# Patient Record
Sex: MALE | Race: WHITE | NOT HISPANIC OR LATINO | URBAN - METROPOLITAN AREA
[De-identification: names, ages, dates, MRNs, and addresses within clinical notes are randomized per-mention and may not be internally consistent; named-entity substitution may affect disease eponyms.]

---

## 2019-10-09 ENCOUNTER — APPOINTMENT (RX ONLY)
Dept: URBAN - METROPOLITAN AREA CLINIC 33 | Facility: CLINIC | Age: 59
Setting detail: DERMATOLOGY
End: 2019-10-09

## 2019-10-09 DIAGNOSIS — Z48.02 ENCOUNTER FOR REMOVAL OF SUTURES: ICD-10-CM

## 2019-10-09 DIAGNOSIS — D18.0 HEMANGIOMA: ICD-10-CM

## 2019-10-09 DIAGNOSIS — L57.0 ACTINIC KERATOSIS: ICD-10-CM

## 2019-10-09 DIAGNOSIS — L21.8 OTHER SEBORRHEIC DERMATITIS: ICD-10-CM

## 2019-10-09 PROBLEM — D18.01 HEMANGIOMA OF SKIN AND SUBCUTANEOUS TISSUE: Status: ACTIVE | Noted: 2019-10-09

## 2019-10-09 PROBLEM — M12.9 ARTHROPATHY, UNSPECIFIED: Status: ACTIVE | Noted: 2019-10-09

## 2019-10-09 PROCEDURE — 17000 DESTRUCT PREMALG LESION: CPT

## 2019-10-09 PROCEDURE — 99024 POSTOP FOLLOW-UP VISIT: CPT

## 2019-10-09 PROCEDURE — ? COUNSELING

## 2019-10-09 PROCEDURE — 17003 DESTRUCT PREMALG LES 2-14: CPT

## 2019-10-09 PROCEDURE — 99213 OFFICE O/P EST LOW 20 MIN: CPT | Mod: 25

## 2019-10-09 PROCEDURE — ? PRESCRIPTION

## 2019-10-09 PROCEDURE — ? LIQUID NITROGEN

## 2019-10-09 PROCEDURE — ? SUTURE REMOVAL (GLOBAL PERIOD)

## 2019-10-09 PROCEDURE — ? OBSERVATION

## 2019-10-09 RX ORDER — HYDROCORTISONE 25 MG/G
CREAM TOPICAL
Qty: 1 | Refills: 2 | Status: ERX | COMMUNITY
Start: 2019-10-09

## 2019-10-09 RX ADMIN — HYDROCORTISONE: 25 CREAM TOPICAL at 00:00

## 2019-10-09 ASSESSMENT — LOCATION DETAILED DESCRIPTION DERM
LOCATION DETAILED: RIGHT LATERAL UPPER BACK
LOCATION DETAILED: LEFT SUPERIOR UPPER BACK
LOCATION DETAILED: LEFT ULNAR DORSAL HAND
LOCATION DETAILED: RIGHT VENTRAL PROXIMAL FOREARM
LOCATION DETAILED: RIGHT LATERAL SUPERIOR CHEST
LOCATION DETAILED: LEFT POSTERIOR SHOULDER
LOCATION DETAILED: RIGHT MEDIAL SUPERIOR CHEST
LOCATION DETAILED: LEFT LATERAL SUPERIOR CHEST
LOCATION DETAILED: LEFT RADIAL DORSAL HAND
LOCATION DETAILED: RIGHT ANTERIOR SHOULDER
LOCATION DETAILED: LEFT MEDIAL SUPERIOR CHEST
LOCATION DETAILED: LEFT MEDIAL INFERIOR CHEST

## 2019-10-09 ASSESSMENT — LOCATION ZONE DERM
LOCATION ZONE: ARM
LOCATION ZONE: HAND
LOCATION ZONE: TRUNK

## 2019-10-09 ASSESSMENT — LOCATION SIMPLE DESCRIPTION DERM
LOCATION SIMPLE: CHEST
LOCATION SIMPLE: LEFT UPPER BACK
LOCATION SIMPLE: RIGHT SHOULDER
LOCATION SIMPLE: RIGHT FOREARM
LOCATION SIMPLE: LEFT HAND
LOCATION SIMPLE: RIGHT UPPER BACK
LOCATION SIMPLE: LEFT SHOULDER

## 2019-10-09 NOTE — PROCEDURE: MIPS QUALITY
Quality 226: Preventive Care And Screening: Tobacco Use: Screening And Cessation Intervention: Patient screened for tobacco use, is a smoker AND received Cessation Counseling
Detail Level: Detailed
Quality 431: Preventive Care And Screening: Unhealthy Alcohol Use - Screening: Patient screened for unhealthy alcohol use using a single question and scores 2 or greater episodes per year and brief intervention occurred

## 2019-10-09 NOTE — PROCEDURE: SUTURE REMOVAL (GLOBAL PERIOD)
Detail Level: Detailed
Add 95902 Cpt? (Important Note: In 2017 The Use Of 88780 Is Being Tracked By Cms To Determine Future Global Period Reimbursement For Global Periods): yes

## 2019-10-09 NOTE — PROCEDURE: LIQUID NITROGEN
Consent: The patient's consent was obtained including but not limited to risks of crusting, scabbing, blistering, scarring, darker or lighter pigmentary change, recurrence, incomplete removal and infection.
Detail Level: Zone
Render Note In Bullet Format When Appropriate: No
Duration Of Freeze Thaw-Cycle (Seconds): 0
Post-Care Instructions: I reviewed with the patient in detail post-care instructions. Patient is to wear sunprotection, and avoid picking at any of the treated lesions. Pt may apply Vaseline to crusted or scabbing areas.

## 2019-11-22 ENCOUNTER — APPOINTMENT (RX ONLY)
Dept: URBAN - METROPOLITAN AREA CLINIC 1 | Facility: CLINIC | Age: 59
Setting detail: DERMATOLOGY
End: 2019-11-22

## 2019-11-22 VITALS — DIASTOLIC BLOOD PRESSURE: 59 MMHG | SYSTOLIC BLOOD PRESSURE: 119 MMHG

## 2019-11-22 PROBLEM — C44.519 BASAL CELL CARCINOMA OF SKIN OF OTHER PART OF TRUNK: Status: ACTIVE | Noted: 2019-11-22

## 2019-11-22 PROBLEM — C44.619 BASAL CELL CARCINOMA OF SKIN OF LEFT UPPER LIMB, INCLUDING SHOULDER: Status: ACTIVE | Noted: 2019-11-22

## 2019-11-22 PROCEDURE — ? REPAIR NOTE

## 2019-11-22 PROCEDURE — ? PRESCRIPTION

## 2019-11-22 PROCEDURE — 13101 CMPLX RPR TRUNK 2.6-7.5 CM: CPT

## 2019-11-22 PROCEDURE — 13102 CMPLX RPR TRUNK ADDL 5CM/<: CPT

## 2019-11-22 PROCEDURE — 17313 MOHS 1 STAGE T/A/L: CPT

## 2019-11-22 PROCEDURE — ? MOHS SURGERY

## 2019-11-22 RX ORDER — CEFDINIR 300 MG/1
CAPSULE ORAL
Qty: 14 | Refills: 0 | Status: ERX | COMMUNITY
Start: 2019-11-22

## 2019-11-22 RX ADMIN — CEFDINIR: 300 CAPSULE ORAL at 17:36

## 2019-11-22 NOTE — HPI: SAME DAY REPAIR FOLLOWING MOHS SURGERY
Body Location Override (Optional): Left shoulder
Which Doctor Performed Mohs? (Optional): Iftikhar Lilly

## 2019-11-22 NOTE — HPI: MOHS SURGERY CONSULTATION
Has The Cancer Been Biopsied Before?: has been previously biopsied
Body Location Override (Optional): left shoulder
When Was Your Biopsy?: 9/25/19
Who Is Your Referring Provider?: Dr. Elliott

## 2019-12-09 ENCOUNTER — APPOINTMENT (RX ONLY)
Dept: URBAN - METROPOLITAN AREA CLINIC 33 | Facility: CLINIC | Age: 59
Setting detail: DERMATOLOGY
End: 2019-12-09

## 2019-12-09 DIAGNOSIS — L21.8 OTHER SEBORRHEIC DERMATITIS: ICD-10-CM

## 2019-12-09 PROBLEM — C44.619 BASAL CELL CARCINOMA OF SKIN OF LEFT UPPER LIMB, INCLUDING SHOULDER: Status: ACTIVE | Noted: 2019-12-09

## 2019-12-09 PROCEDURE — ? TREATMENT REGIMEN

## 2019-12-09 PROCEDURE — ? SUTURE REMOVAL (GLOBAL PERIOD)

## 2019-12-09 PROCEDURE — 99213 OFFICE O/P EST LOW 20 MIN: CPT

## 2019-12-09 PROCEDURE — ? PRESCRIPTION

## 2019-12-09 PROCEDURE — ? COUNSELING

## 2019-12-09 PROCEDURE — 99024 POSTOP FOLLOW-UP VISIT: CPT

## 2019-12-09 RX ORDER — PIMECROLIMUS 10 MG/G
CREAM TOPICAL BID
Qty: 1 | Refills: 3 | Status: ERX | COMMUNITY
Start: 2019-12-09

## 2019-12-09 RX ORDER — HYDROCORTISONE 25 MG/G
CREAM TOPICAL
Qty: 1 | Refills: 0 | Status: ERX

## 2019-12-09 RX ADMIN — PIMECROLIMUS: 10 CREAM TOPICAL at 00:00

## 2019-12-09 ASSESSMENT — LOCATION DETAILED DESCRIPTION DERM
LOCATION DETAILED: RIGHT SUPERIOR MEDIAL MALAR CHEEK
LOCATION DETAILED: LEFT SUPERIOR MEDIAL MALAR CHEEK
LOCATION DETAILED: INFERIOR MID FOREHEAD

## 2019-12-09 ASSESSMENT — LOCATION SIMPLE DESCRIPTION DERM
LOCATION SIMPLE: RIGHT CHEEK
LOCATION SIMPLE: INFERIOR FOREHEAD
LOCATION SIMPLE: LEFT CHEEK

## 2019-12-09 ASSESSMENT — LOCATION ZONE DERM: LOCATION ZONE: FACE

## 2019-12-09 NOTE — PROCEDURE: SUTURE REMOVAL (GLOBAL PERIOD)
Detail Level: Detailed
Add 91397 Cpt? (Important Note: In 2017 The Use Of 58659 Is Being Tracked By Cms To Determine Future Global Period Reimbursement For Global Periods): yes

## 2019-12-09 NOTE — PROCEDURE: TREATMENT REGIMEN
Modify Regimen: HC 2.5% cream to affected areas on face bid after moisturizing cream 3 days on and 3 days off prn for flares
Initiate Treatment: Elidel cream to face bid after moisturizing cream
Plan: Discussed SE of long term use of steroids .\\nIf elidel is not covered by insurance, will consider protopic
Detail Level: Zone

## 2019-12-16 ENCOUNTER — APPOINTMENT (RX ONLY)
Dept: URBAN - METROPOLITAN AREA CLINIC 33 | Facility: CLINIC | Age: 59
Setting detail: DERMATOLOGY
End: 2019-12-16

## 2019-12-16 DIAGNOSIS — L21.8 OTHER SEBORRHEIC DERMATITIS: ICD-10-CM

## 2019-12-16 PROBLEM — C44.619 BASAL CELL CARCINOMA OF SKIN OF LEFT UPPER LIMB, INCLUDING SHOULDER: Status: ACTIVE | Noted: 2019-12-16

## 2019-12-16 PROCEDURE — ? TREATMENT REGIMEN

## 2019-12-16 PROCEDURE — ? STAPLE REMOVAL

## 2019-12-16 PROCEDURE — 99212 OFFICE O/P EST SF 10 MIN: CPT

## 2019-12-16 PROCEDURE — ? COUNSELING

## 2019-12-16 ASSESSMENT — LOCATION DETAILED DESCRIPTION DERM
LOCATION DETAILED: RIGHT CENTRAL MALAR CHEEK
LOCATION DETAILED: LEFT CENTRAL MALAR CHEEK

## 2019-12-16 ASSESSMENT — LOCATION ZONE DERM: LOCATION ZONE: FACE

## 2019-12-16 ASSESSMENT — LOCATION SIMPLE DESCRIPTION DERM
LOCATION SIMPLE: LEFT CHEEK
LOCATION SIMPLE: RIGHT CHEEK

## 2019-12-16 NOTE — PROCEDURE: TREATMENT REGIMEN
Detail Level: Zone
Modify Regimen: HC 2.5% cream to face bid after moisturizer x 2-3 days prn for flares
Continue Regimen: Elidel to face bid after moisturizer

## 2019-12-16 NOTE — PROCEDURE: STAPLE REMOVAL
Add 45781 Cpt? (Important Note: In 2017 The Use Of 75740 Is Being Tracked By Cms To Determine Future Global Period Reimbursement For Global Periods): no
Detail Level: Detailed
